# Patient Record
Sex: FEMALE | Race: WHITE | NOT HISPANIC OR LATINO | URBAN - NONMETROPOLITAN AREA
[De-identification: names, ages, dates, MRNs, and addresses within clinical notes are randomized per-mention and may not be internally consistent; named-entity substitution may affect disease eponyms.]

---

## 2019-09-30 ENCOUNTER — IMPORTED ENCOUNTER (OUTPATIENT)
Dept: URBAN - NONMETROPOLITAN AREA CLINIC 1 | Facility: CLINIC | Age: 49
End: 2019-09-30

## 2019-09-30 PROBLEM — H01.012: Noted: 2019-09-30

## 2019-09-30 PROBLEM — H52.4: Noted: 2019-09-30

## 2019-09-30 PROBLEM — H01.014: Noted: 2019-09-30

## 2019-09-30 PROCEDURE — 92012 INTRM OPH EXAM EST PATIENT: CPT

## 2019-09-30 NOTE — PATIENT DISCUSSION
s/p Stefanie 84 ou 2001. Blepharitis-Discussed with pt.-Recommend pt begin warm compresses and lid scrubs BID. Pt instructed on proper lid scrub technique. -Recommend artificial tears OU QID PRN. start maxitrol qd ou

## 2022-03-19 PROBLEM — J34.2 NASAL SEPTAL DEVIATION: Status: ACTIVE | Noted: 2019-02-06

## 2022-03-20 PROBLEM — J34.89 CONCHA BULLOSA: Status: ACTIVE | Noted: 2019-02-06

## 2022-04-15 ASSESSMENT — TONOMETRY
OD_IOP_MMHG: 11
OS_IOP_MMHG: 11